# Patient Record
Sex: MALE | Race: WHITE | NOT HISPANIC OR LATINO | Employment: FULL TIME | URBAN - NONMETROPOLITAN AREA
[De-identification: names, ages, dates, MRNs, and addresses within clinical notes are randomized per-mention and may not be internally consistent; named-entity substitution may affect disease eponyms.]

---

## 2019-05-21 ENCOUNTER — APPOINTMENT (OUTPATIENT)
Dept: CT IMAGING | Facility: OTHER | Age: 34
End: 2019-05-21
Attending: FAMILY MEDICINE
Payer: OTHER MISCELLANEOUS

## 2019-05-21 ENCOUNTER — HOSPITAL ENCOUNTER (EMERGENCY)
Facility: OTHER | Age: 34
Discharge: HOME | End: 2019-05-21
Attending: FAMILY MEDICINE | Admitting: FAMILY MEDICINE
Payer: OTHER MISCELLANEOUS

## 2019-05-21 VITALS
WEIGHT: 220 LBS | TEMPERATURE: 97.5 F | SYSTOLIC BLOOD PRESSURE: 123 MMHG | HEIGHT: 75 IN | HEART RATE: 67 BPM | OXYGEN SATURATION: 99 % | DIASTOLIC BLOOD PRESSURE: 79 MMHG | RESPIRATION RATE: 12 BRPM | BODY MASS INDEX: 27.35 KG/M2

## 2019-05-21 DIAGNOSIS — S39.91XA BLUNT TRAUMA TO ABDOMEN, INITIAL ENCOUNTER: ICD-10-CM

## 2019-05-21 LAB
ABO + RH BLD: NORMAL
ABO + RH BLD: NORMAL
ALBUMIN SERPL-MCNC: 4.3 G/DL (ref 3.5–5.7)
ALP SERPL-CCNC: 69 U/L (ref 34–104)
ALT SERPL W P-5'-P-CCNC: 26 U/L (ref 7–52)
ANION GAP SERPL CALCULATED.3IONS-SCNC: 9 MMOL/L (ref 3–14)
AST SERPL W P-5'-P-CCNC: 22 U/L (ref 13–39)
BASOPHILS # BLD AUTO: 0.1 10E9/L (ref 0–0.2)
BASOPHILS NFR BLD AUTO: 0.8 %
BILIRUB SERPL-MCNC: 0.5 MG/DL (ref 0.3–1)
BLD GP AB SCN SERPL QL: NORMAL
BLOOD BANK CMNT PATIENT-IMP: NORMAL
BUN SERPL-MCNC: 14 MG/DL (ref 7–25)
CALCIUM SERPL-MCNC: 9.3 MG/DL (ref 8.6–10.3)
CHLORIDE SERPL-SCNC: 103 MMOL/L (ref 98–107)
CO2 SERPL-SCNC: 25 MMOL/L (ref 21–31)
CREAT SERPL-MCNC: 0.96 MG/DL (ref 0.7–1.3)
DIFFERENTIAL METHOD BLD: NORMAL
EOSINOPHIL # BLD AUTO: 0.1 10E9/L (ref 0–0.7)
EOSINOPHIL NFR BLD AUTO: 1.8 %
ERYTHROCYTE [DISTWIDTH] IN BLOOD BY AUTOMATED COUNT: 12.2 % (ref 10–15)
GFR SERPL CREATININE-BSD FRML MDRD: >90 ML/MIN/{1.73_M2}
GLUCOSE SERPL-MCNC: 140 MG/DL (ref 70–105)
HCT VFR BLD AUTO: 43.7 % (ref 40–53)
HGB BLD-MCNC: 15 G/DL (ref 13.3–17.7)
IMM GRANULOCYTES # BLD: 0 10E9/L (ref 0–0.4)
IMM GRANULOCYTES NFR BLD: 0.5 %
INR PPP: 0.96 (ref 0–1.3)
LYMPHOCYTES # BLD AUTO: 2.8 10E9/L (ref 0.8–5.3)
LYMPHOCYTES NFR BLD AUTO: 43.4 %
MCH RBC QN AUTO: 29.4 PG (ref 26.5–33)
MCHC RBC AUTO-ENTMCNC: 34.3 G/DL (ref 31.5–36.5)
MCV RBC AUTO: 86 FL (ref 78–100)
MONOCYTES # BLD AUTO: 0.6 10E9/L (ref 0–1.3)
MONOCYTES NFR BLD AUTO: 8.4 %
NEUTROPHILS # BLD AUTO: 3 10E9/L (ref 1.6–8.3)
NEUTROPHILS NFR BLD AUTO: 45.1 %
PLATELET # BLD AUTO: 216 10E9/L (ref 150–450)
POTASSIUM SERPL-SCNC: 4 MMOL/L (ref 3.5–5.1)
PROT SERPL-MCNC: 7.1 G/DL (ref 6.4–8.9)
RBC # BLD AUTO: 5.11 10E12/L (ref 4.4–5.9)
SODIUM SERPL-SCNC: 137 MMOL/L (ref 134–144)
SPECIMEN EXP DATE BLD: NORMAL
WBC # BLD AUTO: 6.6 10E9/L (ref 4–11)

## 2019-05-21 PROCEDURE — 25500064 ZZH RX 255 OP 636: Performed by: FAMILY MEDICINE

## 2019-05-21 PROCEDURE — 25000128 H RX IP 250 OP 636: Performed by: FAMILY MEDICINE

## 2019-05-21 PROCEDURE — 86901 BLOOD TYPING SEROLOGIC RH(D): CPT | Performed by: FAMILY MEDICINE

## 2019-05-21 PROCEDURE — 76377 3D RENDER W/INTRP POSTPROCES: CPT

## 2019-05-21 PROCEDURE — 74177 CT ABD & PELVIS W/CONTRAST: CPT

## 2019-05-21 PROCEDURE — 99285 EMERGENCY DEPT VISIT HI MDM: CPT | Mod: 25 | Performed by: FAMILY MEDICINE

## 2019-05-21 PROCEDURE — 86850 RBC ANTIBODY SCREEN: CPT | Performed by: FAMILY MEDICINE

## 2019-05-21 PROCEDURE — 99284 EMERGENCY DEPT VISIT MOD MDM: CPT | Mod: Z6 | Performed by: FAMILY MEDICINE

## 2019-05-21 PROCEDURE — 68300004 ZZH PARTIAL TRAUMA W/O CC LEVEL III: Performed by: FAMILY MEDICINE

## 2019-05-21 PROCEDURE — 85025 COMPLETE CBC W/AUTO DIFF WBC: CPT | Performed by: FAMILY MEDICINE

## 2019-05-21 PROCEDURE — 80053 COMPREHEN METABOLIC PANEL: CPT | Performed by: FAMILY MEDICINE

## 2019-05-21 PROCEDURE — 86900 BLOOD TYPING SEROLOGIC ABO: CPT | Performed by: FAMILY MEDICINE

## 2019-05-21 PROCEDURE — 96374 THER/PROPH/DIAG INJ IV PUSH: CPT | Mod: XU | Performed by: FAMILY MEDICINE

## 2019-05-21 PROCEDURE — 36415 COLL VENOUS BLD VENIPUNCTURE: CPT | Performed by: FAMILY MEDICINE

## 2019-05-21 PROCEDURE — 85610 PROTHROMBIN TIME: CPT | Performed by: FAMILY MEDICINE

## 2019-05-21 PROCEDURE — 72131 CT LUMBAR SPINE W/O DYE: CPT

## 2019-05-21 RX ORDER — MORPHINE SULFATE 4 MG/ML
4 INJECTION, SOLUTION INTRAMUSCULAR; INTRAVENOUS
Status: COMPLETED | OUTPATIENT
Start: 2019-05-21 | End: 2019-05-21

## 2019-05-21 RX ORDER — SODIUM CHLORIDE 9 MG/ML
1000 INJECTION, SOLUTION INTRAVENOUS CONTINUOUS
Status: DISCONTINUED | OUTPATIENT
Start: 2019-05-21 | End: 2019-05-21 | Stop reason: HOSPADM

## 2019-05-21 RX ADMIN — SODIUM CHLORIDE 1000 ML: 9 INJECTION, SOLUTION INTRAVENOUS at 11:27

## 2019-05-21 RX ADMIN — IOHEXOL 100 ML: 350 INJECTION, SOLUTION INTRAVENOUS at 11:36

## 2019-05-21 RX ADMIN — MORPHINE SULFATE 4 MG: 4 INJECTION INTRAVENOUS at 11:46

## 2019-05-21 ASSESSMENT — ENCOUNTER SYMPTOMS
DIARRHEA: 0
DIFFICULTY URINATING: 0
HEADACHES: 0
POLYPHAGIA: 0
COLOR CHANGE: 0
ABDOMINAL PAIN: 1
BACK PAIN: 0
SHORTNESS OF BREATH: 0
NAUSEA: 0
POLYDIPSIA: 0
ARTHRALGIAS: 0
PALPITATIONS: 0
NECK STIFFNESS: 0
EYE REDNESS: 0
CONFUSION: 0
FEVER: 0
DYSURIA: 0
ABDOMINAL DISTENTION: 0
AGITATION: 0
BLOOD IN STOOL: 0

## 2019-05-21 ASSESSMENT — MIFFLIN-ST. JEOR: SCORE: 2033.54
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
## 2019-05-21 NOTE — ED AVS SNAPSHOT
[facility]  [address]  [place] [address]  Phone:  [phone]  Fax:  [phone]                                    Andrea Goldman   MRN: [id number]    Department:  [facility]   Date of Visit:  5/21/2019           After Visit Summary Signature Page    I have received my discharge instructions, and my questions have been answered. I have discussed any challenges I see with this plan with the nurse or doctor.    ..........................................................................................................................................  Patient/Patient Representative Signature      ..........................................................................................................................................  Patient Representative Print Name and Relationship to Patient    ..................................................               ................................................  Date                                   Time    ..........................................................................................................................................  Reviewed by Signature/Title    ...................................................              ..............................................  Date                                               Time          22EPIC Rev 08/18

## 2024-07-13 PROCEDURE — 99283 EMERGENCY DEPT VISIT LOW MDM: CPT | Performed by: FAMILY MEDICINE

## 2024-07-14 ENCOUNTER — HOSPITAL ENCOUNTER (EMERGENCY)
Facility: OTHER | Age: 39
Discharge: HOME | End: 2024-07-14
Attending: FAMILY MEDICINE | Admitting: FAMILY MEDICINE
Payer: OTHER MISCELLANEOUS

## 2024-07-14 VITALS
WEIGHT: 225 LBS | TEMPERATURE: 97.8 F | HEIGHT: 75 IN | RESPIRATION RATE: 18 BRPM | BODY MASS INDEX: 27.98 KG/M2 | OXYGEN SATURATION: 98 % | HEART RATE: 69 BPM | DIASTOLIC BLOOD PRESSURE: 82 MMHG | SYSTOLIC BLOOD PRESSURE: 138 MMHG

## 2024-07-14 DIAGNOSIS — T15.91XA FOREIGN BODY OF RIGHT EYE, INITIAL ENCOUNTER: ICD-10-CM

## 2024-07-14 PROCEDURE — 250N000009 HC RX 250: Performed by: FAMILY MEDICINE

## 2024-07-14 RX ORDER — ERYTHROMYCIN 5 MG/G
0.5 OINTMENT OPHTHALMIC 4 TIMES DAILY
Qty: 3.5 G | Refills: 0 | Status: SHIPPED | OUTPATIENT
Start: 2024-07-14

## 2024-07-14 RX ORDER — TETRACAINE HYDROCHLORIDE 5 MG/ML
1-2 SOLUTION OPHTHALMIC ONCE
Status: COMPLETED | OUTPATIENT
Start: 2024-07-14 | End: 2024-07-14

## 2024-07-14 RX ORDER — ERYTHROMYCIN 5 MG/G
OINTMENT OPHTHALMIC ONCE
Status: COMPLETED | OUTPATIENT
Start: 2024-07-14 | End: 2024-07-14

## 2024-07-14 RX ADMIN — FLUORESCEIN SODIUM 1 STRIP: 1 STRIP OPHTHALMIC at 02:54

## 2024-07-14 RX ADMIN — TETRACAINE HYDROCHLORIDE 2 DROP: 5 SOLUTION OPHTHALMIC at 02:55

## 2024-07-14 RX ADMIN — ERYTHROMYCIN 1 G: 5 OINTMENT OPHTHALMIC at 03:51

## 2024-07-14 ASSESSMENT — ENCOUNTER SYMPTOMS
EYE REDNESS: 1
FEVER: 0
EYE PAIN: 1
PHOTOPHOBIA: 1

## 2024-07-14 ASSESSMENT — ACTIVITIES OF DAILY LIVING (ADL)
ADLS_ACUITY_SCORE: 33
ADLS_ACUITY_SCORE: 35

## 2024-07-14 ASSESSMENT — VISUAL ACUITY: OU: 1

## 2024-07-14 NOTE — ED PROVIDER NOTES
"  History     Chief Complaint   Patient presents with    Foreign Body in Eye     HPI  Andrea Goldman is a 38 year old male who presents with right eye pain.  Onset was 3 days ago.  He is a .  He thinks some rest fell in his eye while he was working.  It was not a high velocity type injury.  He tried flushing it at work but continued to have a scratchy and foreign body sensation in his eye for the next 3 days.  Today the eye became more painful and he was having some blurry vision and so he comes in for further evaluation.  He does not wear contacts or glasses.  No fevers or chills.  No drainage from the eye.  The eye itself has been more red than usual.    Allergies:  Allergies   Allergen Reactions    Penicillins Unknown       Problem List:    There are no problems to display for this patient.       Past Medical History:    No past medical history on file.    Past Surgical History:    No past surgical history on file.    Family History:    No family history on file.    Social History:  Marital Status:  Single [1]  Social History     Tobacco Use    Smoking status: Every Day     Current packs/day: 0.50     Average packs/day: 0.5 packs/day for 10.0 years (5.0 ttl pk-yrs)     Types: Cigarettes    Smokeless tobacco: Never   Substance Use Topics    Alcohol use: Yes     Comment: Alcoholic Drinks/day: rare    Drug use: Unknown     Types: Other     Comment: Drug use: No        Medications:    erythromycin (ROMYCIN) 5 MG/GM ophthalmic ointment          Review of Systems   Constitutional:  Negative for fever.   Eyes:  Positive for photophobia, pain and redness.       Physical Exam   BP: 138/82  Pulse: 69  Temp: 97.8  F (36.6  C)  Resp: 18  Height: 190.5 cm (6' 3\")  Weight: 102.1 kg (225 lb)  SpO2: 98 %      Physical Exam  Constitutional:       Appearance: Normal appearance.   Eyes:      General: Lids are normal. Lids are everted, no foreign bodies appreciated. Vision grossly intact. Gaze aligned appropriately.        "  Right eye: No foreign body, discharge or hordeolum.      Conjunctiva/sclera:      Right eye: Right conjunctiva is injected. No exudate or hemorrhage.    Neurological:      Mental Status: He is alert.         ED Course     ED Course as of 07/14/24 0328   Sun Jul 14, 2024   0259 R eye     Procedures              Critical Care time:  none               No results found for this or any previous visit (from the past 24 hour(s)).    Medications   erythromycin (ROMYCIN) ophthalmic ointment (has no administration in time range)   tetracaine (PONTOCAINE) 0.5 % ophthalmic solution 1-2 drop (2 drops Left Eye $Given 7/14/24 0255)   fluorescein (FUL-CONRADO) ophthalmic strip 1 strip (1 strip Right Eye $Given 7/14/24 0254)       Assessments & Plan (with Medical Decision Making)     I have reviewed the nursing notes.    I have reviewed the findings, diagnosis, plan and need for follow up with the patient.           Medical Decision Making  The patient's presentation was of low complexity (2+ clearly self-limited or minor problems).    The patient's evaluation involved:  history and exam without other MDM data elements    The patient's management necessitated only low risk treatment.        Current Discharge Medication List        START taking these medications    Details   erythromycin (ROMYCIN) 5 MG/GM ophthalmic ointment Place 0.5 inches into the right eye 4 times daily  Qty: 3.5 g, Refills: 0             Final diagnoses:   Foreign body of right eye, initial encounter   I evaluated under fluorescein.  No foreign body identified.  There is what appears to be some type of birthmark on the iris of the right eye.  Suspect he had a piece of metal in his eye causing topical abrasion.  Erythromycin given here in the emergency room.  Prescription sent to pharmacy.  He does not wear contacts.  Close follow-up with PCP.  Return if not improving in the next 24 to 48 hours.    7/13/2024   [facility]       Doris Hawley  DO  07/14/24 0637

## 2024-07-14 NOTE — ED TRIAGE NOTES
"Pt presents with spouse for an eye injury. Pt works as a , and 3-4 days ago had a rust chunk in the right eye that he attempted to wash out with saline, since then has been having worsening pain and redness in the eye, with a \"scratchy\" sensation, and brief episodes of blurry vision.    /82   Pulse 69   Temp 97.8  F (36.6  C) (Tympanic)   Resp 18   Ht 1.905 m (6' 3\")   Wt 102.1 kg (225 lb)   SpO2 98%   BMI 28.12 kg/m         Triage Assessment (Adult)       Row Name 07/14/24 0010          Triage Assessment    Airway WDL WDL        Respiratory WDL    Respiratory WDL WDL        Skin Circulation/Temperature WDL    Skin Circulation/Temperature WDL WDL        Cardiac WDL    Cardiac WDL WDL        Peripheral/Neurovascular WDL    Peripheral Neurovascular WDL WDL        Cognitive/Neuro/Behavioral WDL    Cognitive/Neuro/Behavioral WDL WDL                     "

## 2024-07-14 NOTE — DISCHARGE INSTRUCTIONS
No obvious debris in the eye.  However, often times what ever was in the I will scratch the actual eyeball itself causing redness and pain and the sensation of sandpaper.  This is from inflammation.  If you wear contacts please do not wear contacts for the next several days.  I have given you a prescription for erythromycin.  This is an antibiotic ointment to help cut down on risk of infection.  The eye will likely continue to feel irritated for the next several days.  If, however, it is not improving within about the next 3 days I recommend you come back in.  We can always do an x-ray to see if small fragments of metal can be seen.  If any worsening pain, double vision or blurry vision that is changing drainage from the eye fevers or other concerns please return to the emergency room

## (undated) RX ORDER — ERYTHROMYCIN 5 MG/G
OINTMENT OPHTHALMIC
Status: DISPENSED
Start: 2024-07-14

## (undated) RX ORDER — TETRACAINE HYDROCHLORIDE 5 MG/ML
SOLUTION OPHTHALMIC
Status: DISPENSED
Start: 2024-07-14